# Patient Record
Sex: MALE | Race: WHITE | URBAN - METROPOLITAN AREA
[De-identification: names, ages, dates, MRNs, and addresses within clinical notes are randomized per-mention and may not be internally consistent; named-entity substitution may affect disease eponyms.]

---

## 2023-02-15 ENCOUNTER — OFFICE VISIT (OUTPATIENT)
Dept: INTERNAL MEDICINE CLINIC | Facility: CLINIC | Age: 53
End: 2023-02-15
Payer: COMMERCIAL

## 2023-02-15 VITALS
WEIGHT: 156 LBS | HEIGHT: 70 IN | TEMPERATURE: 98 F | BODY MASS INDEX: 22.33 KG/M2 | DIASTOLIC BLOOD PRESSURE: 88 MMHG | RESPIRATION RATE: 15 BRPM | HEART RATE: 81 BPM | SYSTOLIC BLOOD PRESSURE: 130 MMHG | OXYGEN SATURATION: 97 %

## 2023-02-15 DIAGNOSIS — N52.9 ERECTILE DYSFUNCTION, UNSPECIFIED ERECTILE DYSFUNCTION TYPE: ICD-10-CM

## 2023-02-15 DIAGNOSIS — Z00.00 HEALTH MAINTENANCE EXAMINATION: Primary | ICD-10-CM

## 2023-02-15 DIAGNOSIS — I10 PRIMARY HYPERTENSION: ICD-10-CM

## 2023-02-15 LAB
ALBUMIN SERPL-MCNC: 4.1 G/DL (ref 3.4–5)
ALBUMIN/GLOB SERPL: 1.5 {RATIO} (ref 1–2)
ALP LIVER SERPL-CCNC: 56 U/L
ALT SERPL-CCNC: 32 U/L
ANION GAP SERPL CALC-SCNC: 7 MMOL/L (ref 0–18)
AST SERPL-CCNC: 25 U/L (ref 15–37)
ATRIAL RATE: 69 BPM
BASOPHILS # BLD AUTO: 0.06 X10(3) UL (ref 0–0.2)
BASOPHILS NFR BLD AUTO: 1.2 %
BILIRUB SERPL-MCNC: 0.6 MG/DL (ref 0.1–2)
BUN BLD-MCNC: 12 MG/DL (ref 7–18)
BUN/CREAT SERPL: 13.2 (ref 10–20)
CALCIUM BLD-MCNC: 9.1 MG/DL (ref 8.5–10.1)
CHLORIDE SERPL-SCNC: 108 MMOL/L (ref 98–112)
CHOLEST SERPL-MCNC: 188 MG/DL (ref ?–200)
CO2 SERPL-SCNC: 26 MMOL/L (ref 21–32)
COMPLEXED PSA SERPL-MCNC: 1.61 NG/ML (ref ?–4)
CREAT BLD-MCNC: 0.91 MG/DL
DEPRECATED RDW RBC AUTO: 36.7 FL (ref 35.1–46.3)
EOSINOPHIL # BLD AUTO: 0.25 X10(3) UL (ref 0–0.7)
EOSINOPHIL NFR BLD AUTO: 4.8 %
ERYTHROCYTE [DISTWIDTH] IN BLOOD BY AUTOMATED COUNT: 11.9 % (ref 11–15)
FASTING PATIENT LIPID ANSWER: YES
FASTING STATUS PATIENT QL REPORTED: YES
GFR SERPLBLD BASED ON 1.73 SQ M-ARVRAT: 101 ML/MIN/1.73M2 (ref 60–?)
GLOBULIN PLAS-MCNC: 2.8 G/DL (ref 2.8–4.4)
GLUCOSE BLD-MCNC: 107 MG/DL (ref 70–99)
HCT VFR BLD AUTO: 43.8 %
HCV AB SERPL QL IA: NONREACTIVE
HDLC SERPL-MCNC: 38 MG/DL (ref 40–59)
HGB BLD-MCNC: 15.1 G/DL
IMM GRANULOCYTES # BLD AUTO: 0.01 X10(3) UL (ref 0–1)
IMM GRANULOCYTES NFR BLD: 0.2 %
LDLC SERPL CALC-MCNC: 121 MG/DL (ref ?–100)
LYMPHOCYTES # BLD AUTO: 1.52 X10(3) UL (ref 1–4)
LYMPHOCYTES NFR BLD AUTO: 29.2 %
MCH RBC QN AUTO: 29.2 PG (ref 26–34)
MCHC RBC AUTO-ENTMCNC: 34.5 G/DL (ref 31–37)
MCV RBC AUTO: 84.6 FL
MONOCYTES # BLD AUTO: 0.28 X10(3) UL (ref 0.1–1)
MONOCYTES NFR BLD AUTO: 5.4 %
NEUTROPHILS # BLD AUTO: 3.08 X10 (3) UL (ref 1.5–7.7)
NEUTROPHILS # BLD AUTO: 3.08 X10(3) UL (ref 1.5–7.7)
NEUTROPHILS NFR BLD AUTO: 59.2 %
NONHDLC SERPL-MCNC: 150 MG/DL (ref ?–130)
OSMOLALITY SERPL CALC.SUM OF ELEC: 292 MOSM/KG (ref 275–295)
P AXIS: 73 DEGREES
P-R INTERVAL: 170 MS
PLATELET # BLD AUTO: 159 10(3)UL (ref 150–450)
POTASSIUM SERPL-SCNC: 3.7 MMOL/L (ref 3.5–5.1)
PROT SERPL-MCNC: 6.9 G/DL (ref 6.4–8.2)
Q-T INTERVAL: 382 MS
QRS DURATION: 88 MS
QTC CALCULATION (BEZET): 409 MS
R AXIS: 70 DEGREES
RBC # BLD AUTO: 5.18 X10(6)UL
SODIUM SERPL-SCNC: 141 MMOL/L (ref 136–145)
T AXIS: 79 DEGREES
TRIGL SERPL-MCNC: 161 MG/DL (ref 30–149)
VENTRICULAR RATE: 69 BPM
VLDLC SERPL CALC-MCNC: 28 MG/DL (ref 0–30)
WBC # BLD AUTO: 5.2 X10(3) UL (ref 4–11)

## 2023-02-15 PROCEDURE — 80053 COMPREHEN METABOLIC PANEL: CPT | Performed by: FAMILY MEDICINE

## 2023-02-15 PROCEDURE — 90472 IMMUNIZATION ADMIN EACH ADD: CPT | Performed by: FAMILY MEDICINE

## 2023-02-15 PROCEDURE — 85025 COMPLETE CBC W/AUTO DIFF WBC: CPT | Performed by: FAMILY MEDICINE

## 2023-02-15 PROCEDURE — 99204 OFFICE O/P NEW MOD 45 MIN: CPT | Performed by: FAMILY MEDICINE

## 2023-02-15 PROCEDURE — 84153 ASSAY OF PSA TOTAL: CPT | Performed by: FAMILY MEDICINE

## 2023-02-15 PROCEDURE — 93000 ELECTROCARDIOGRAM COMPLETE: CPT | Performed by: FAMILY MEDICINE

## 2023-02-15 PROCEDURE — 90750 HZV VACC RECOMBINANT IM: CPT | Performed by: FAMILY MEDICINE

## 2023-02-15 PROCEDURE — 90471 IMMUNIZATION ADMIN: CPT | Performed by: FAMILY MEDICINE

## 2023-02-15 PROCEDURE — 90686 IIV4 VACC NO PRSV 0.5 ML IM: CPT | Performed by: FAMILY MEDICINE

## 2023-02-15 PROCEDURE — 3075F SYST BP GE 130 - 139MM HG: CPT | Performed by: FAMILY MEDICINE

## 2023-02-15 PROCEDURE — 99386 PREV VISIT NEW AGE 40-64: CPT | Performed by: FAMILY MEDICINE

## 2023-02-15 PROCEDURE — 80061 LIPID PANEL: CPT | Performed by: FAMILY MEDICINE

## 2023-02-15 PROCEDURE — 3079F DIAST BP 80-89 MM HG: CPT | Performed by: FAMILY MEDICINE

## 2023-02-15 PROCEDURE — 3008F BODY MASS INDEX DOCD: CPT | Performed by: FAMILY MEDICINE

## 2023-02-15 PROCEDURE — 86803 HEPATITIS C AB TEST: CPT | Performed by: FAMILY MEDICINE

## 2023-02-15 RX ORDER — SILDENAFIL 50 MG/1
TABLET, FILM COATED ORAL
COMMUNITY
Start: 2021-06-11 | End: 2023-02-15

## 2023-02-15 RX ORDER — LISINOPRIL 10 MG/1
10 TABLET ORAL DAILY
Qty: 90 TABLET | Refills: 3 | Status: SHIPPED | OUTPATIENT
Start: 2023-02-15

## 2023-02-15 RX ORDER — LISINOPRIL 10 MG/1
10 TABLET ORAL DAILY
COMMUNITY
Start: 2021-12-14 | End: 2023-02-15

## 2023-02-15 RX ORDER — SILDENAFIL 50 MG/1
50 TABLET, FILM COATED ORAL
Qty: 30 TABLET | Refills: 3 | Status: SHIPPED | OUTPATIENT
Start: 2023-02-15

## 2023-02-15 NOTE — ASSESSMENT & PLAN NOTE
Blood pressure within normal limits today. Continue lisinopril 10 mg daily. Check CMP. Check baseline EKG-normal sinus rhythm, rate of 69, normal axis, normal EKG.

## 2023-02-15 NOTE — PATIENT INSTRUCTIONS
PATIENT INSTRUCTIONS    Thank you for seeing me today, it was a pleasure taking care of you. Please check out at the  and schedule a follow up appointment. Return in about 1 year (around 2/15/2024) for physical.  Please get your labs drawn - you may need to schedule a lab appointment if this was not completed at your recent doctor's visit. The following imaging studies were ordered: None  Please also follow up with the following specialists: None  Please fill out the advance directive information (power of  documents) and bring a copy to our clinic. Continue healthy diet, get in some exercise  Can continue sildenafil for now - check Mr. Number and see if it is cheaper to use this rather than go through your insurance. If you prefer to go through your insurance, you can let me know and we may have to try alternative medication   Please return to get your second shingles vaccine in 2 months (2 doses needed). You may schedule a nursing visit to get your vaccines.   GABRIEL Haider,   Dr. Aliya Agosto

## 2023-02-15 NOTE — ASSESSMENT & PLAN NOTE
History of erectile dysfunction, reports that sildenafil works well for him. Continue sildenafil 50 mg as needed. If not covered under insurance, may need to consider alternative such as tadalafil. Discussed with patient that he can consider using Goodrx as well. Follow up as needed.

## 2023-02-15 NOTE — ASSESSMENT & PLAN NOTE
Exercise and diet advised. CBC, CMP, lipid panel , HIV screen, hepatitis C screenPSA  Flu shot today. Shingles vaccine today. Can return for second vaccine via nursing visit. Advanced directive information provided. Advised COVID vaccine booster.

## 2023-02-16 ENCOUNTER — MED REC SCAN ONLY (OUTPATIENT)
Dept: INTERNAL MEDICINE CLINIC | Facility: CLINIC | Age: 53
End: 2023-02-16

## 2023-12-13 ENCOUNTER — HOSPITAL ENCOUNTER (OUTPATIENT)
Dept: CT IMAGING | Facility: HOSPITAL | Age: 53
Discharge: HOME | End: 2023-12-13
Attending: FAMILY MEDICINE

## 2023-12-13 VITALS — DIASTOLIC BLOOD PRESSURE: 80 MMHG | SYSTOLIC BLOOD PRESSURE: 126 MMHG

## 2023-12-13 DIAGNOSIS — Z13.6 SCREENING FOR CARDIOVASCULAR CONDITION: ICD-10-CM

## 2023-12-14 NOTE — PROGRESS NOTES
Date of Service 12/13/2023    Jose Clarke  Date of Birth 4/24/1970    Patient Age: 46year old    PCP: Syeda Albarado MD  [address] [facility] [address]    Heart Scan Consult  Preliminary Heart Scan Score: 6.55    Previous Screening  Heart Scan Completed Previously: No        Peripheral Vascular Scan Completed Previously: No          Risk Factors  Personal Risk Factors  Non-alterable Risk Factors: Age;Gender  Alterable Risk Factors: High Blood Pressure; Abnormal Cholesterol;Lack of exercise      Body Mass Index  There is no height or weight on file to calculate BMI. Blood Pressure     /80 (BP Location: Right arm)     (Normal =< 120/80,  Elevated = 120-129/ >80,  High Stage1 130-139/80-89 , Stage2 >140/>90)    Lipid Profile  Cholesterol: 188, done on 2/15/2023. HDL Cholesterol: 38, done on 2/15/2023. LDL Cholesterol: 121, done on 2/15/2023. TriGlycerides 161, done on 2/15/2023. Cholesterol Goals  Value   Total  =< 200   HDL  = > 45 Men = > 55 Women   LDL   =< 100   Triglycerides  =< 150       Glucose and Hemoglobin A1C  Lab Results   Component Value Date     (H) 02/15/2023     (Normal Fasting Glucose < 100mg/dl )    Nurse Review  Risk factor information and results reviewed with Nurse: Yes    Recommended Follow Up:  Consult your physician regarding[de-identified] Final Heart Scan Report; Discuss potential for Incidental Finding      Recommendations for Change:  Nutrition Changes: Low Saturated Fat;Low Fat Dairy; Low Salt Eating; Increase Fiber    Cholesterol Modification (goal of therapy depends upon your risk): Increase HDL (Healthy/Good) Normal >45 Men >55 Women;Decrease LDL (Lousy/Bad) Ideal <100;Decrease Triglycerides (Ugly) Normal <150    Exercise: Enhance Current Program                   Repeat Heart Scan: 3 years if Calcium Score is >0. 0; Discuss with your Physician              [facility] Recommended Resources:  Recommended Resources: Upcoming Classes, Medical Services and Health Library [email or web address]     Other Resources[de-identified] Handouts given - Controlling Risk Factors, Cholesterol, Triglycerides, and High Blood Pressure      Cheryl Kelley RN        Please Contact the Nurse Heart Line with any Questions or Concerns [phone].

## 2023-12-14 NOTE — ADDENDUM NOTE
Encounter addended by: Ravi Brown RN on: 12/13/2023 8:07 PM   Actions taken: Flowsheet accepted, Clinical Note Signed

## 2024-10-25 ENCOUNTER — OFFICE VISIT (OUTPATIENT)
Dept: INTERNAL MEDICINE CLINIC | Facility: CLINIC | Age: 54
End: 2024-10-25
Payer: COMMERCIAL

## 2024-10-25 VITALS
TEMPERATURE: 98 F | DIASTOLIC BLOOD PRESSURE: 86 MMHG | SYSTOLIC BLOOD PRESSURE: 140 MMHG | BODY MASS INDEX: 23.05 KG/M2 | HEIGHT: 70 IN | HEART RATE: 88 BPM | OXYGEN SATURATION: 98 % | WEIGHT: 161 LBS

## 2024-10-25 DIAGNOSIS — Z00.00 HEALTH MAINTENANCE EXAMINATION: Primary | ICD-10-CM

## 2024-10-25 DIAGNOSIS — N52.9 ERECTILE DYSFUNCTION, UNSPECIFIED ERECTILE DYSFUNCTION TYPE: ICD-10-CM

## 2024-10-25 DIAGNOSIS — I10 PRIMARY HYPERTENSION: ICD-10-CM

## 2024-10-25 LAB
ALBUMIN SERPL-MCNC: 4.4 G/DL (ref 3.2–4.8)
ALBUMIN/GLOB SERPL: 1.8 {RATIO} (ref 1–2)
ALP LIVER SERPL-CCNC: 60 U/L
ALT SERPL-CCNC: 31 U/L
ANION GAP SERPL CALC-SCNC: 5 MMOL/L (ref 0–18)
AST SERPL-CCNC: 31 U/L (ref ?–34)
BILIRUB SERPL-MCNC: 1 MG/DL (ref 0.3–1.2)
BUN BLD-MCNC: 19 MG/DL (ref 9–23)
BUN/CREAT SERPL: 18.4 (ref 10–20)
CALCIUM BLD-MCNC: 9.3 MG/DL (ref 8.7–10.4)
CHLORIDE SERPL-SCNC: 104 MMOL/L (ref 98–112)
CHOLEST SERPL-MCNC: 215 MG/DL (ref ?–200)
CO2 SERPL-SCNC: 30 MMOL/L (ref 21–32)
CREAT BLD-MCNC: 1.03 MG/DL
EGFRCR SERPLBLD CKD-EPI 2021: 86 ML/MIN/1.73M2 (ref 60–?)
FASTING PATIENT LIPID ANSWER: YES
FASTING STATUS PATIENT QL REPORTED: YES
GLOBULIN PLAS-MCNC: 2.5 G/DL (ref 2–3.5)
GLUCOSE BLD-MCNC: 98 MG/DL (ref 70–99)
HDLC SERPL-MCNC: 34 MG/DL (ref 40–59)
LDLC SERPL CALC-MCNC: 131 MG/DL (ref ?–100)
NONHDLC SERPL-MCNC: 181 MG/DL (ref ?–130)
OSMOLALITY SERPL CALC.SUM OF ELEC: 290 MOSM/KG (ref 275–295)
POTASSIUM SERPL-SCNC: 4.6 MMOL/L (ref 3.5–5.1)
PROT SERPL-MCNC: 6.9 G/DL (ref 5.7–8.2)
SODIUM SERPL-SCNC: 139 MMOL/L (ref 136–145)
TRIGL SERPL-MCNC: 280 MG/DL (ref 30–149)
VLDLC SERPL CALC-MCNC: 52 MG/DL (ref 0–30)

## 2024-10-25 PROCEDURE — 90471 IMMUNIZATION ADMIN: CPT | Performed by: FAMILY MEDICINE

## 2024-10-25 PROCEDURE — 87389 HIV-1 AG W/HIV-1&-2 AB AG IA: CPT | Performed by: FAMILY MEDICINE

## 2024-10-25 PROCEDURE — 99396 PREV VISIT EST AGE 40-64: CPT | Performed by: FAMILY MEDICINE

## 2024-10-25 PROCEDURE — 80053 COMPREHEN METABOLIC PANEL: CPT | Performed by: FAMILY MEDICINE

## 2024-10-25 PROCEDURE — 80061 LIPID PANEL: CPT | Performed by: FAMILY MEDICINE

## 2024-10-25 PROCEDURE — 90656 IIV3 VACC NO PRSV 0.5 ML IM: CPT | Performed by: FAMILY MEDICINE

## 2024-10-25 RX ORDER — LISINOPRIL 10 MG/1
10 TABLET ORAL DAILY
Qty: 90 TABLET | Refills: 0 | Status: SHIPPED | OUTPATIENT
Start: 2024-10-25

## 2024-10-25 NOTE — PROGRESS NOTES
FAMILY MEDICINE CLINIC NOTE    HPI  Rafi Garrett is a 54 year old male presenting for physical    #Health Maintenance  -Diet: Wife tries to cook organic foods. Now more vegetarian. Not much red meat.   -Exercise: Not lately. Job is physical  -Lung cancer screen: Not indicated  -Colon cancer screen: - colonoscopy 7/12/2021 at [facility], repeat in 10 years  -Prostate cancer screen: 2/2023  -Aortic aneurysm screen: Not indicated  -Statin:  Will check lipid panel  -ASA: Not indicated  -HIV screen: Indicated  -Hep C screen: 2/2023 negative  -Gonorrhea/chlamydia:  Not indicated  -Syphillis: Not indicated  -TB: Not indicated  -Tobacco/alcohol: Per below  -Depression: PHQ-2 score of 0 (score >/= 3 do PHQ-9)  -Advanced Directive: Indicated     #Immunizations  -Tdap: 7/2015  -Flu shot: Indicated  -PCV13: Not indicated   -PCV20: Not indicated   -PPSV23: Not indicated   -HPV: Not indicated  -RZV (preferred) or VZL: 1/2024, 2/2023   -RSV: Not indicated  -COVID: Pfizer     #HTN  -lisinopril 10 mg daily - has been out for 1 month   -no issues with medication     #Erectile dysfunction  -sildenafil 50 mg daily  -reports no issues with medication, works well    #Patient Care Team  Patient Care Team:  Óscar Larkin MD as PCP - General (Family Medicine)    ROS  GENERAL: No fever/chills, no recent weight loss   HEENT: No visual changes, no changes in hearing, no sore throats  NECK: No pain, no swelling  RESP: No cough, no SOB  CV: No chest pain, no palpitations  GI: No abd pain, no N/V/D  MSK: No edema, no pain  SKIN: No new rashes  NEURO: No numbness, no tingling, no HA    HEALTH MAINTENANCE CHECKLIST  Health Maintenance Topics with due status: Overdue       Topic Date Due    Zoster Vaccines 04/12/2023    Annual Depression Screening 01/01/2024    Annual Physical 02/15/2024    COVID-19 Vaccine 09/01/2024    Influenza Vaccine 10/01/2024       ALLERGIES  Allergies[1]    MEDICATIONS  Current Outpatient Medications    Medication Sig Dispense Refill    lisinopril 10 MG Oral Tab Take 1 tablet (10 mg total) by mouth daily. 90 tablet 0    Sildenafil Citrate 50 MG Oral Tab Take 1 tablet (50 mg total) by mouth daily as needed for Erectile Dysfunction. 30 tablet 3       ACTIVE PROBLEM  Patient Active Problem List   Diagnosis    Primary hypertension    Erectile dysfunction    Health maintenance examination       PAST MEDICAL HISTORY  Past Medical History:    Erectile dysfunction    Primary hypertension       PAST SOCIAL HISTORY  Social History     Socioeconomic History    Marital status:      Spouse name: Not on file    Number of children: Not on file    Years of education: Not on file    Highest education level: Not on file   Occupational History    Not on file   Tobacco Use    Smoking status: Never     Passive exposure: Never    Smokeless tobacco: Never   Vaping Use    Vaping status: Never Used   Substance and Sexual Activity    Alcohol use: Yes     Comment: Drinks approximately 4 times a year    Drug use: Never    Sexual activity: Yes     Partners: Female   Other Topics Concern    Not on file   Social History Narrative    Relationships:  - Leila    Children: None    Pets: 2 dogs    School: N/A    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     Origin: Originally from [place]     Interests: Enjoys playing [employer] - electric [employer]. Enjoys traveling.     Spiritual: Not Congregation, not spiritual. Atheist.      Social Drivers of Health     Financial Resource Strain: Not on file   Food Insecurity: Not on file   Transportation Needs: Not on file   Physical Activity: Not on file   Stress: Not on file   Social Connections: Not on file   Housing Stability: Not on file       PAST SURGICAL HISTORY  History reviewed. No pertinent surgical history.    PAST FAMILY HISTORY  Family History   Problem Relation Age of Onset    No Known Problems Mother     No Known Problems Father     Hypertension Brother      No Known Problems Maternal Grandmother     No Known Problems Maternal Grandfather     No Known Problems Paternal Grandmother     No Known Problems Paternal Grandfather     Colon Cancer Neg     Prostate Cancer Neg        PHYSICAL EXAM  Vitals:    10/25/24 1114   BP: 140/86   Pulse: 88   Temp: 98 °F (36.7 °C)   SpO2: 98%   Weight: 161 lb (73 kg)   Height: 5' 10\" (1.778 m)      Body mass index is 23.1 kg/m².    GENERAL: NAD  HEENT: Moist mucous membranes, no tonsillar swelling or erythema, PERRLA bilat, TM translucent and non-bulging  NECK: Supple, non-tender  RESP: CTAB, no wheezing, no rales, no rhonchi  CV: RRR, no murmurs  GI: Soft, non-distended, non-tender, no guarding, no rebound, no masses  MSK: No edema  SKIN: Warm and dry, no rashes  NEURO: Answering questions appropriately    LABS  Lab Results   Component Value Date    WBC 5.2 02/15/2023    HGB 15.1 02/15/2023    HCT 43.8 02/15/2023    .0 02/15/2023    NEPERCENT 59.2 02/15/2023    LYPERCENT 29.2 02/15/2023    MOPERCENT 5.4 02/15/2023    EOPERCENT 4.8 02/15/2023    BAPERCENT 1.2 02/15/2023    NE 3.08 02/15/2023    LYMABS 1.52 02/15/2023    MOABSO 0.28 02/15/2023    EOABSO 0.25 02/15/2023    BAABSO 0.06 02/15/2023       Lab Results   Component Value Date     02/15/2023    K 3.7 02/15/2023     02/15/2023    CO2 26.0 02/15/2023    ANIONGAP 7 02/15/2023    BUN 12 02/15/2023    CREATSERUM 0.91 02/15/2023    BUNCREA 13.2 02/15/2023     (H) 02/15/2023    CA 9.1 02/15/2023    OSMOCALC 292 02/15/2023    ALT 32 02/15/2023    AST 25 02/15/2023    ALKPHO 56 02/15/2023    BILT 0.6 02/15/2023    TP 6.9 02/15/2023    ALB 4.1 02/15/2023    GLOBULIN 2.8 02/15/2023    ELECTAG 1.5 02/15/2023    FASTING Yes 02/15/2023    FASTING Yes 02/15/2023         Lab Results   Component Value Date    CHOLEST 188 02/15/2023    TRIG 161 (H) 02/15/2023    HDL 38 (L) 02/15/2023     (H) 02/15/2023    VLDL 28 02/15/2023    NONHDLC 150 (H) 02/15/2023         DIAGNOSTICS    ASSESSMENT/PLAN  Problem List Items Addressed This Visit          Cardiac and Vasculature    Primary hypertension     Blood pressure elevated  He has been off his lisinopril - ran out  Restart lisinopril 10 mg daily.  Monitor blood pressures at home and notify me of readings - I will adjust pending his home readings.   Check CMP.         Relevant Medications    lisinopril 10 MG Oral Tab    Other Relevant Orders    Comp Metabolic Panel (14)       Genitourinary and Reproductive    Erectile dysfunction     History of erectile dysfunction, reports that sildenafil works well for him.  Continue sildenafil 50 mg as needed.  Follow up as needed.         Relevant Medications    lisinopril 10 MG Oral Tab       Health Encounters    Health maintenance examination - Primary     Exercise and diet advised.  CMP, lipid panel , HIV screen (previously forgot to order)  Flu shot today.  Advised COVID vaccine  Advanced directive information provided.           Relevant Orders    Comp Metabolic Panel (14)    HIV Ag/Ab Combo    Lipid Panel    INFLUENZA VACCINE, TRI, PRESERV FREE, 0.5 ML       Return in about 1 year (around 10/25/2025) for physical.    Óscar Larkin MD  Family Medicine         [1] No Known Allergies

## 2024-10-25 NOTE — PATIENT INSTRUCTIONS
PATIENT INSTRUCTIONS    Thank you for seeing me today, it was a pleasure taking care of you.  Please check out at the  and schedule a follow up appointment.  Return in about 1 year (around 10/25/2025) for physical.  Please remember that the preferred [place] period for appointments is 5 minutes. This is to help maximize the amount of time that we can spend together at our visits.    Please get your labs drawn - you may need to schedule a lab appointment if this was not completed at your recent doctor's visit.  The following imaging studies were ordered: None  Please also follow up with the following specialists: None  Please fill out the advance directive information (power of  documents) and bring a copy to our clinic.  Continue to focus on a healthy diet, get in more exercise  Consider COVID vaccine  Restart lisinopril 10 mg daily  Monitor blood pressures at home after restarting medication  Notify me of blood pressure readings - I need to record this       Best,   Dr. aLrkin

## 2024-10-25 NOTE — ASSESSMENT & PLAN NOTE
Exercise and diet advised.  CMP, lipid panel , HIV screen (previously forgot to order)  Flu shot today.  Advised COVID vaccine  Advanced directive information provided.

## 2024-10-25 NOTE — ASSESSMENT & PLAN NOTE
Blood pressure elevated  He has been off his lisinopril - ran out  Restart lisinopril 10 mg daily.  Monitor blood pressures at home and notify me of readings - I will adjust pending his home readings.   Check CMP.

## 2024-10-25 NOTE — ASSESSMENT & PLAN NOTE
History of erectile dysfunction, reports that sildenafil works well for him.  Continue sildenafil 50 mg as needed.  Follow up as needed.

## 2025-02-03 DIAGNOSIS — I10 PRIMARY HYPERTENSION: ICD-10-CM

## 2025-02-04 RX ORDER — LISINOPRIL 10 MG/1
10 TABLET ORAL DAILY
Qty: 90 TABLET | Refills: 0 | Status: SHIPPED | OUTPATIENT
Start: 2025-02-04

## 2025-05-19 DIAGNOSIS — I10 PRIMARY HYPERTENSION: ICD-10-CM

## 2025-05-21 RX ORDER — LISINOPRIL 10 MG/1
10 TABLET ORAL DAILY
Qty: 90 TABLET | Refills: 0 | Status: SHIPPED | OUTPATIENT
Start: 2025-05-21